# Patient Record
Sex: FEMALE | Race: ASIAN | NOT HISPANIC OR LATINO
[De-identification: names, ages, dates, MRNs, and addresses within clinical notes are randomized per-mention and may not be internally consistent; named-entity substitution may affect disease eponyms.]

---

## 2018-09-17 ENCOUNTER — RX RENEWAL (OUTPATIENT)
Age: 38
End: 2018-09-17

## 2018-09-17 PROBLEM — Z00.00 ENCOUNTER FOR PREVENTIVE HEALTH EXAMINATION: Status: ACTIVE | Noted: 2018-09-17

## 2018-11-07 ENCOUNTER — MEDICATION RENEWAL (OUTPATIENT)
Age: 38
End: 2018-11-07

## 2018-11-11 ENCOUNTER — OTHER (OUTPATIENT)
Age: 38
End: 2018-11-11

## 2018-11-20 ENCOUNTER — RECORD ABSTRACTING (OUTPATIENT)
Age: 38
End: 2018-11-20

## 2018-11-20 DIAGNOSIS — Z83.3 FAMILY HISTORY OF DIABETES MELLITUS: ICD-10-CM

## 2018-11-20 DIAGNOSIS — Z86.39 PERSONAL HISTORY OF OTHER ENDOCRINE, NUTRITIONAL AND METABOLIC DISEASE: ICD-10-CM

## 2018-11-20 DIAGNOSIS — Z83.49 FAMILY HISTORY OF OTHER ENDOCRINE, NUTRITIONAL AND METABOLIC DISEASES: ICD-10-CM

## 2018-11-21 ENCOUNTER — APPOINTMENT (OUTPATIENT)
Dept: ENDOCRINOLOGY | Facility: CLINIC | Age: 38
End: 2018-11-21
Payer: COMMERCIAL

## 2018-11-21 VITALS
WEIGHT: 179 LBS | SYSTOLIC BLOOD PRESSURE: 126 MMHG | DIASTOLIC BLOOD PRESSURE: 80 MMHG | BODY MASS INDEX: 28.77 KG/M2 | HEART RATE: 88 BPM | HEIGHT: 66 IN

## 2018-11-21 LAB — GLUCOSE BLDC GLUCOMTR-MCNC: 118

## 2018-11-21 PROCEDURE — 82962 GLUCOSE BLOOD TEST: CPT

## 2018-11-21 PROCEDURE — 99214 OFFICE O/P EST MOD 30 MIN: CPT | Mod: 25

## 2018-11-21 RX ORDER — SITAGLIPTIN 100 MG/1
100 TABLET, FILM COATED ORAL
Qty: 90 | Refills: 1 | Status: DISCONTINUED | COMMUNITY
Start: 2018-09-17 | End: 2018-11-21

## 2018-11-21 RX ORDER — PEN NEEDLE, DIABETIC 32 GX 1/4"
32G X 6 MM NEEDLE, DISPOSABLE MISCELLANEOUS
Qty: 100 | Refills: 3 | Status: ACTIVE | COMMUNITY
Start: 2018-11-21 | End: 1900-01-01

## 2019-01-24 ENCOUNTER — OTHER (OUTPATIENT)
Age: 39
End: 2019-01-24

## 2019-01-25 ENCOUNTER — CLINICAL ADVICE (OUTPATIENT)
Age: 39
End: 2019-01-25

## 2019-02-01 ENCOUNTER — CLINICAL ADVICE (OUTPATIENT)
Age: 39
End: 2019-02-01

## 2019-03-16 ENCOUNTER — RX RENEWAL (OUTPATIENT)
Age: 39
End: 2019-03-16

## 2019-03-20 ENCOUNTER — RECORD ABSTRACTING (OUTPATIENT)
Age: 39
End: 2019-03-20

## 2019-03-21 ENCOUNTER — APPOINTMENT (OUTPATIENT)
Dept: ENDOCRINOLOGY | Facility: CLINIC | Age: 39
End: 2019-03-21
Payer: COMMERCIAL

## 2019-03-21 VITALS
HEART RATE: 95 BPM | SYSTOLIC BLOOD PRESSURE: 110 MMHG | DIASTOLIC BLOOD PRESSURE: 60 MMHG | WEIGHT: 171 LBS | HEIGHT: 66 IN | BODY MASS INDEX: 27.48 KG/M2

## 2019-03-21 LAB — GLUCOSE BLDC GLUCOMTR-MCNC: 110

## 2019-03-21 PROCEDURE — 82962 GLUCOSE BLOOD TEST: CPT

## 2019-03-21 PROCEDURE — 99214 OFFICE O/P EST MOD 30 MIN: CPT | Mod: 25

## 2019-03-21 RX ORDER — LEVOTHYROXINE SODIUM 50 UG/1
50 TABLET ORAL
Refills: 0 | Status: DISCONTINUED | COMMUNITY
End: 2019-03-21

## 2019-03-21 RX ORDER — SITAGLIPTIN 100 MG/1
100 TABLET, FILM COATED ORAL
Refills: 0 | Status: DISCONTINUED | COMMUNITY
End: 2019-03-21

## 2019-03-21 RX ORDER — INSULIN DETEMIR 100 [IU]/ML
100 INJECTION, SOLUTION SUBCUTANEOUS
Refills: 0 | Status: DISCONTINUED | COMMUNITY
End: 2019-03-21

## 2019-03-21 NOTE — HISTORY OF PRESENT ILLNESS
[FreeTextEntry1] : Viviane is a 38y old Female who presents with a primary complaint of diabetes, thyroid status\par Quality:  Type 2.   \par Severity:  Moderate\par Duration:  3 years\par Onset:  found DM on labs  \par Modifying Factors:  Better with medication.  \par Notes:  Current regimen:\par 	metformin	1000 bid\par 	trulicity .75\par 	levemir 	20 - tapered off\par levemir started soon after diagnosis of diabetes. On presentation A1C was over 12% but without symptoms or weight loss.\par Placed on actos, but discontinued due to edema.\par monitors glucose; occasional post meal severe elevations\par \par \par \par Blood Glucose Testing Information \par \par Patient is using the  meter and is testing 3 times per day.\par \par \par Past Medical History\par Notes:  hypothyroid, on T4 .075. Recent diagnosis\par Other: no plans for childbearing.  \par

## 2019-03-21 NOTE — ASSESSMENT
[FreeTextEntry1] : 1. DM type 2, well controlled. Stop insulin\par 2. Hypothyroid, euthyroid on replacement\par 3. Hyperlipidemia controlled

## 2019-03-21 NOTE — PHYSICAL EXAM
[Thyroid Not Enlarged] : the thyroid was not enlarged [No Thyroid Nodules] : there were no palpable thyroid nodules

## 2019-03-29 ENCOUNTER — OUTPATIENT (OUTPATIENT)
Dept: OUTPATIENT SERVICES | Facility: HOSPITAL | Age: 39
LOS: 1 days | End: 2019-03-29

## 2019-06-12 ENCOUNTER — RX RENEWAL (OUTPATIENT)
Age: 39
End: 2019-06-12

## 2019-08-02 ENCOUNTER — APPOINTMENT (OUTPATIENT)
Dept: ENDOCRINOLOGY | Facility: CLINIC | Age: 39
End: 2019-08-02
Payer: COMMERCIAL

## 2019-08-02 VITALS
OXYGEN SATURATION: 98 % | WEIGHT: 162 LBS | DIASTOLIC BLOOD PRESSURE: 78 MMHG | HEIGHT: 66 IN | SYSTOLIC BLOOD PRESSURE: 120 MMHG | BODY MASS INDEX: 26.03 KG/M2 | HEART RATE: 80 BPM

## 2019-08-02 LAB — GLUCOSE BLDC GLUCOMTR-MCNC: 94

## 2019-08-02 PROCEDURE — 82962 GLUCOSE BLOOD TEST: CPT

## 2019-08-02 PROCEDURE — 99214 OFFICE O/P EST MOD 30 MIN: CPT | Mod: 25

## 2019-08-02 NOTE — HISTORY OF PRESENT ILLNESS
[FreeTextEntry1] : Viviane is a 39y old Female who presents with a primary complaint of diabetes, thyroid status\par Quality:  Type 2.   \par Severity:  Moderate\par Duration:  3 years\par Onset:  found DM on labs  \par Modifying Factors:  Better with medication.  \par Notes:  Current regimen:\par 	metformin	1000 bid\par 	trulicity .75\par 	levemir 	20 - tapered off\par levemir started soon after diagnosis of diabetes. On presentation A1C was over 12% but without symptoms or weight loss.\par Placed on actos, but discontinued due to edema.\par monitors glucose; occasional post meal severe elevations\par \par \par \par Blood Glucose Testing Information \par \par Patient is using the  meter and is testing 3 times per day.\par \par \par Past Medical History\par Notes:  hypothyroid, on T4 .075. Recent diagnosis\par Other: no plans for childbearing.  \par

## 2019-08-02 NOTE — ASSESSMENT
[FreeTextEntry1] : 1. DM type 2, well controlled. Will reduce metformin to 500 bid, as pt. may have some Gi intolerance from this, and is doing very well now.\par 2. Hypothyroid, slight TSH elevation may be due to recent lapse in T4 use. Resume .075\par 3. Hyperlipidemia controlled

## 2019-08-12 ENCOUNTER — MEDICATION RENEWAL (OUTPATIENT)
Age: 39
End: 2019-08-12

## 2019-11-04 ENCOUNTER — MEDICATION RENEWAL (OUTPATIENT)
Age: 39
End: 2019-11-04

## 2019-11-07 ENCOUNTER — RX RENEWAL (OUTPATIENT)
Age: 39
End: 2019-11-07

## 2020-01-07 ENCOUNTER — LABORATORY RESULT (OUTPATIENT)
Age: 40
End: 2020-01-07

## 2020-01-07 ENCOUNTER — APPOINTMENT (OUTPATIENT)
Dept: ENDOCRINOLOGY | Facility: CLINIC | Age: 40
End: 2020-01-07
Payer: COMMERCIAL

## 2020-01-07 PROCEDURE — 36415 COLL VENOUS BLD VENIPUNCTURE: CPT

## 2020-01-08 ENCOUNTER — APPOINTMENT (OUTPATIENT)
Dept: ENDOCRINOLOGY | Facility: CLINIC | Age: 40
End: 2020-01-08
Payer: COMMERCIAL

## 2020-01-08 VITALS
HEIGHT: 66 IN | DIASTOLIC BLOOD PRESSURE: 70 MMHG | HEART RATE: 80 BPM | OXYGEN SATURATION: 99 % | SYSTOLIC BLOOD PRESSURE: 110 MMHG | BODY MASS INDEX: 26.68 KG/M2 | WEIGHT: 166 LBS

## 2020-01-08 LAB
ALBUMIN SERPL ELPH-MCNC: 4.2 G/DL
ALP BLD-CCNC: 56 U/L
ALT SERPL-CCNC: 12 U/L
ANION GAP SERPL CALC-SCNC: 13 MMOL/L
AST SERPL-CCNC: 14 U/L
BASOPHILS # BLD AUTO: 0.03 K/UL
BASOPHILS NFR BLD AUTO: 0.4 %
BILIRUB SERPL-MCNC: 0.5 MG/DL
BUN SERPL-MCNC: 15 MG/DL
CALCIUM SERPL-MCNC: 9.7 MG/DL
CHLORIDE SERPL-SCNC: 103 MMOL/L
CHOLEST SERPL-MCNC: 110 MG/DL
CHOLEST/HDLC SERPL: 2.3 RATIO
CO2 SERPL-SCNC: 23 MMOL/L
CREAT SERPL-MCNC: 0.71 MG/DL
EOSINOPHIL # BLD AUTO: 0.04 K/UL
EOSINOPHIL NFR BLD AUTO: 0.6 %
ESTIMATED AVERAGE GLUCOSE: 134 MG/DL
GLUCOSE BLDC GLUCOMTR-MCNC: 96
GLUCOSE SERPL-MCNC: 134 MG/DL
HBA1C MFR BLD HPLC: 6.3 %
HCT VFR BLD CALC: 40.6 %
HDLC SERPL-MCNC: 47 MG/DL
HGB BLD-MCNC: 12.4 G/DL
IMM GRANULOCYTES NFR BLD AUTO: 0.1 %
LDLC SERPL CALC-MCNC: 47 MG/DL
LYMPHOCYTES # BLD AUTO: 1.72 K/UL
LYMPHOCYTES NFR BLD AUTO: 25.6 %
MAN DIFF?: NORMAL
MCHC RBC-ENTMCNC: 24.7 PG
MCHC RBC-ENTMCNC: 30.5 GM/DL
MCV RBC AUTO: 80.7 FL
MONOCYTES # BLD AUTO: 0.3 K/UL
MONOCYTES NFR BLD AUTO: 4.5 %
NEUTROPHILS # BLD AUTO: 4.62 K/UL
NEUTROPHILS NFR BLD AUTO: 68.8 %
PLATELET # BLD AUTO: 282 K/UL
POTASSIUM SERPL-SCNC: 4.6 MMOL/L
PROT SERPL-MCNC: 6.4 G/DL
RBC # BLD: 5.03 M/UL
RBC # FLD: 16.6 %
SODIUM SERPL-SCNC: 139 MMOL/L
T3RU NFR SERPL: 1 TBI
T4 SERPL-MCNC: 6.1 UG/DL
TRIGL SERPL-MCNC: 80 MG/DL
TSH SERPL-ACNC: 4.6 UIU/ML
WBC # FLD AUTO: 6.72 K/UL

## 2020-01-08 PROCEDURE — 82962 GLUCOSE BLOOD TEST: CPT

## 2020-01-08 PROCEDURE — 99214 OFFICE O/P EST MOD 30 MIN: CPT | Mod: 25

## 2020-01-08 NOTE — HISTORY OF PRESENT ILLNESS
[FreeTextEntry1] : Viviane is a 39y old Female who presents with a primary complaint of diabetes, thyroid status\par Quality:  Type 2.   \par Severity:  Moderate\par Duration:  4 years\par Onset:  found DM on labs  \par Modifying Factors:  Better with medication.  \par Notes:  Current regimen:\par 	metformin	 500 bid\par 	trulicity .75\par 	levemir 	20 - tapered off\par levemir started soon after diagnosis of diabetes. On presentation A1C was over 12% but without symptoms or weight loss.\par Placed on actos, but discontinued due to edema.\par monitors glucose; occasional post meal severe elevations\par \par \par \par Blood Glucose Testing Information \par \par Patient is using the  meter and is testing 3 times per day.\par \par \par Past Medical History\par Notes:  hypothyroid, on T4 .075. Recent diagnosis\par Other: no plans for childbearing.  \par

## 2020-01-08 NOTE — ASSESSMENT
[FreeTextEntry1] : 1. DM type 2, well controlled. \par 2. Hypothyroid, slight TSH elevation persists. Increase T4 to .088\par 3. Hyperlipidemia controlled

## 2020-04-14 ENCOUNTER — TRANSCRIPTION ENCOUNTER (OUTPATIENT)
Age: 40
End: 2020-04-14

## 2020-07-02 ENCOUNTER — LABORATORY RESULT (OUTPATIENT)
Age: 40
End: 2020-07-02

## 2020-07-02 ENCOUNTER — APPOINTMENT (OUTPATIENT)
Dept: ENDOCRINOLOGY | Facility: CLINIC | Age: 40
End: 2020-07-02
Payer: COMMERCIAL

## 2020-07-02 PROCEDURE — 36415 COLL VENOUS BLD VENIPUNCTURE: CPT

## 2020-07-03 LAB
ALBUMIN SERPL ELPH-MCNC: 4.7 G/DL
ALP BLD-CCNC: 54 U/L
ALT SERPL-CCNC: 14 U/L
ANION GAP SERPL CALC-SCNC: 15 MMOL/L
AST SERPL-CCNC: 16 U/L
BASOPHILS # BLD AUTO: 0.06 K/UL
BASOPHILS NFR BLD AUTO: 0.8 %
BILIRUB SERPL-MCNC: 0.4 MG/DL
BUN SERPL-MCNC: 21 MG/DL
CALCIUM SERPL-MCNC: 9.7 MG/DL
CHLORIDE SERPL-SCNC: 101 MMOL/L
CHOLEST SERPL-MCNC: 167 MG/DL
CHOLEST/HDLC SERPL: 3 RATIO
CO2 SERPL-SCNC: 22 MMOL/L
CREAT SERPL-MCNC: 0.75 MG/DL
CREAT SPEC-SCNC: 153 MG/DL
EOSINOPHIL # BLD AUTO: 0.06 K/UL
EOSINOPHIL NFR BLD AUTO: 0.8 %
ESTIMATED AVERAGE GLUCOSE: 157 MG/DL
GLUCOSE SERPL-MCNC: 138 MG/DL
HBA1C MFR BLD HPLC: 7.1 %
HCT VFR BLD CALC: 43.6 %
HDLC SERPL-MCNC: 55 MG/DL
HGB BLD-MCNC: 13.3 G/DL
IMM GRANULOCYTES NFR BLD AUTO: 0.1 %
LDLC SERPL CALC-MCNC: 95 MG/DL
LYMPHOCYTES # BLD AUTO: 1.91 K/UL
LYMPHOCYTES NFR BLD AUTO: 26.2 %
MAN DIFF?: NORMAL
MCHC RBC-ENTMCNC: 25.1 PG
MCHC RBC-ENTMCNC: 30.5 GM/DL
MCV RBC AUTO: 82.4 FL
MICROALBUMIN 24H UR DL<=1MG/L-MCNC: <1.2 MG/DL
MICROALBUMIN/CREAT 24H UR-RTO: NORMAL MG/G
MONOCYTES # BLD AUTO: 0.38 K/UL
MONOCYTES NFR BLD AUTO: 5.2 %
NEUTROPHILS # BLD AUTO: 4.86 K/UL
NEUTROPHILS NFR BLD AUTO: 66.9 %
PLATELET # BLD AUTO: 251 K/UL
POTASSIUM SERPL-SCNC: 4.6 MMOL/L
PROT SERPL-MCNC: 7.1 G/DL
RBC # BLD: 5.29 M/UL
RBC # FLD: 16.4 %
SODIUM SERPL-SCNC: 138 MMOL/L
T3RU NFR SERPL: 1 TBI
T4 SERPL-MCNC: 7.4 UG/DL
TRIGL SERPL-MCNC: 83 MG/DL
TSH SERPL-ACNC: 1.91 UIU/ML
WBC # FLD AUTO: 7.28 K/UL

## 2020-07-06 ENCOUNTER — APPOINTMENT (OUTPATIENT)
Dept: ENDOCRINOLOGY | Facility: CLINIC | Age: 40
End: 2020-07-06
Payer: COMMERCIAL

## 2020-07-06 PROCEDURE — 99214 OFFICE O/P EST MOD 30 MIN: CPT | Mod: 95

## 2020-07-06 RX ORDER — LANCETS 33 GAUGE
EACH MISCELLANEOUS
Qty: 4 | Refills: 3 | Status: ACTIVE | COMMUNITY
Start: 2020-07-06 | End: 1900-01-01

## 2020-07-06 RX ORDER — BLOOD SUGAR DIAGNOSTIC
STRIP MISCELLANEOUS
Qty: 400 | Refills: 1 | Status: ACTIVE | COMMUNITY
Start: 2018-09-17 | End: 1900-01-01

## 2020-07-06 NOTE — ASSESSMENT
[FreeTextEntry1] : 1. DM type 2, mild loss of control. Can do better with lifestyle changes, no need for additional drug rx\par 2. Hypothyroid, euthyroid on replacement\par 3. Hyperlipidemia controlled

## 2020-07-06 NOTE — HISTORY OF PRESENT ILLNESS
[Other Location: e.g. School (Enter Location, City,State)___] : at [unfilled], at the time of the visit. [Medical Office: ([facility])___] : at the medical office located in  [Verbal consent obtained from patient] : the patient, [unfilled] [FreeTextEntry1] : Viviane is a 39y old Female who presents with a primary complaint of diabetes, thyroid status\par Quality:  Type 2.   \par Severity:  Moderate\par Duration:  4 years\par Onset:  found DM on labs  \par Modifying Factors:  Better with medication.  \par Notes:  Current regimen:\par 	metformin	 500 bid\par 	trulicity .75\par 	levemir 	20 - tapered off\par levemir started soon after diagnosis of diabetes. On presentation A1C was over 12% but without symptoms or weight loss.\par Placed on actos, but discontinued due to edema.\par monitors glucose; occasional post meal severe elevations\par \par \par \par Blood Glucose Testing Information \par \par Patient is using the  meter and is testing 3 times per day.\par \par \par Past Medical History\par Notes:  hypothyroid, on T4 .088. Recent diagnosis\par Other: no plans for childbearing.  \par s/p COVID

## 2020-07-09 ENCOUNTER — RX RENEWAL (OUTPATIENT)
Age: 40
End: 2020-07-09

## 2020-10-19 ENCOUNTER — RX RENEWAL (OUTPATIENT)
Age: 40
End: 2020-10-19

## 2020-12-22 ENCOUNTER — RX RENEWAL (OUTPATIENT)
Age: 40
End: 2020-12-22

## 2021-01-22 ENCOUNTER — APPOINTMENT (OUTPATIENT)
Dept: ENDOCRINOLOGY | Facility: CLINIC | Age: 41
End: 2021-01-22
Payer: COMMERCIAL

## 2021-01-22 VITALS
DIASTOLIC BLOOD PRESSURE: 82 MMHG | OXYGEN SATURATION: 98 % | HEIGHT: 66 IN | SYSTOLIC BLOOD PRESSURE: 120 MMHG | WEIGHT: 174 LBS | HEART RATE: 72 BPM | BODY MASS INDEX: 27.97 KG/M2

## 2021-01-22 DIAGNOSIS — E78.9 DISORDER OF LIPOPROTEIN METABOLISM, UNSPECIFIED: ICD-10-CM

## 2021-01-22 LAB
GLUCOSE BLDC GLUCOMTR-MCNC: 172
HBA1C MFR BLD HPLC: 7.3
LDLC SERPL DIRECT ASSAY-MCNC: 80

## 2021-01-22 PROCEDURE — 99214 OFFICE O/P EST MOD 30 MIN: CPT | Mod: 25

## 2021-01-22 PROCEDURE — 99072 ADDL SUPL MATRL&STAF TM PHE: CPT

## 2021-01-22 PROCEDURE — 82962 GLUCOSE BLOOD TEST: CPT

## 2021-01-22 NOTE — ASSESSMENT
[FreeTextEntry1] : 1. DM type 2, loss of control. Has had prior success with keot diet, bt relapsed when off diet. Advise f/u with CDE, and increase in trulicity to 1.5 mg\par 2. Hypothyroid, euthyroid on replacement\par 3. Hyperlipidemia controlled

## 2021-01-22 NOTE — HISTORY OF PRESENT ILLNESS
[FreeTextEntry1] : Viviane is a 40y old Female who presents with a primary complaint of diabetes, thyroid status\par Quality:  Type 2.   \par Severity:  Moderate\par Duration:  5 years\par Onset:  found DM on labs  \par Modifying Factors:  Better with medication.  \par Notes:  Current regimen:\par 	metformin	 500 bid\par 	trulicity .75\par 	levemir 	20 - tapered off\par levemir started soon after diagnosis of diabetes. On presentation A1C was over 12% but without symptoms or weight loss.\par Placed on actos, but discontinued due to edema.\par monitors glucose; occasional post meal severe elevations\par \par \par \par Blood Glucose Testing Information \par \par Patient is using the  meter and is testing 3 times per day.\par \par \par Past Medical History\par Notes:  hypothyroid, on T4 .088. \par Other: no plans for childbearing.  \par

## 2021-05-08 ENCOUNTER — RX RENEWAL (OUTPATIENT)
Age: 41
End: 2021-05-08

## 2021-06-04 ENCOUNTER — RX RENEWAL (OUTPATIENT)
Age: 41
End: 2021-06-04

## 2021-07-01 LAB
HBA1C MFR BLD HPLC: 11.1
LDLC SERPL DIRECT ASSAY-MCNC: 52

## 2021-07-02 ENCOUNTER — APPOINTMENT (OUTPATIENT)
Dept: ENDOCRINOLOGY | Facility: CLINIC | Age: 41
End: 2021-07-02
Payer: COMMERCIAL

## 2021-07-02 VITALS
DIASTOLIC BLOOD PRESSURE: 74 MMHG | WEIGHT: 175 LBS | SYSTOLIC BLOOD PRESSURE: 120 MMHG | BODY MASS INDEX: 28.12 KG/M2 | HEART RATE: 88 BPM | HEIGHT: 66 IN | OXYGEN SATURATION: 99 %

## 2021-07-02 DIAGNOSIS — E11.9 TYPE 2 DIABETES MELLITUS W/OUT COMPLICATIONS: ICD-10-CM

## 2021-07-02 DIAGNOSIS — E78.00 PURE HYPERCHOLESTEROLEMIA, UNSPECIFIED: ICD-10-CM

## 2021-07-02 LAB — GLUCOSE BLDC GLUCOMTR-MCNC: 118

## 2021-07-02 PROCEDURE — 99214 OFFICE O/P EST MOD 30 MIN: CPT | Mod: 25

## 2021-07-02 PROCEDURE — 99072 ADDL SUPL MATRL&STAF TM PHE: CPT

## 2021-07-02 PROCEDURE — 82962 GLUCOSE BLOOD TEST: CPT

## 2021-07-02 RX ORDER — DULAGLUTIDE 1.5 MG/.5ML
1.5 INJECTION, SOLUTION SUBCUTANEOUS
Qty: 12 | Refills: 3 | Status: ACTIVE | COMMUNITY
Start: 2019-06-12 | End: 1900-01-01

## 2021-07-02 NOTE — ASSESSMENT
[FreeTextEntry1] : 1. DM type 2, recent improvement in control with diet changes and increase in metformin. Increase trulicity to 1.5\par 2. Hypothyroid, euthyroid on replacement\par 3. Hyperlipidemia controlled

## 2021-07-02 NOTE — HISTORY OF PRESENT ILLNESS
[FreeTextEntry1] : Viviane is a 41y old Female who presents with a primary complaint of diabetes, thyroid status\par Quality:  Type 2.   \par Severity:  Moderate\par Duration:  2 years\par Onset:  found DM on labs  \par Modifying Factors:  Better with medication.  \par Notes:  Current regimen:\par 	metformin	 500 two bid\par 	trulicity .75\par 	levemir 	20 - tapered off\par levemir started soon after diagnosis of diabetes. On presentation A1C was over 12% but without symptoms or weight loss.\par Placed on actos, but discontinued due to edema.\par Loss of control 4/2021 with dietary poor choices; no symptoms. NOw improved\par \par \par \par Blood Glucose Testing Information \par \par Patient is using the  meter and is testing 3 times per day.\par \par \par Past Medical History\par Notes:  hypothyroid, on T4 .088. \par Other: no plans for childbearing.  \par

## 2021-09-15 ENCOUNTER — RX RENEWAL (OUTPATIENT)
Age: 41
End: 2021-09-15

## 2021-09-15 RX ORDER — METFORMIN HYDROCHLORIDE 500 MG/1
500 TABLET, COATED ORAL
Qty: 180 | Refills: 3 | Status: ACTIVE | COMMUNITY
Start: 2018-09-17 | End: 1900-01-01

## 2021-10-24 ENCOUNTER — RX RENEWAL (OUTPATIENT)
Age: 41
End: 2021-10-24

## 2021-10-29 ENCOUNTER — APPOINTMENT (OUTPATIENT)
Dept: ENDOCRINOLOGY | Facility: CLINIC | Age: 41
End: 2021-10-29
Payer: COMMERCIAL

## 2021-10-29 PROCEDURE — 36415 COLL VENOUS BLD VENIPUNCTURE: CPT

## 2021-10-31 LAB
ALBUMIN SERPL ELPH-MCNC: 4.4 G/DL
ALP BLD-CCNC: 86 U/L
ALT SERPL-CCNC: 19 U/L
ANION GAP SERPL CALC-SCNC: 12 MMOL/L
AST SERPL-CCNC: 16 U/L
BASOPHILS # BLD AUTO: 0.05 K/UL
BASOPHILS NFR BLD AUTO: 0.7 %
BILIRUB SERPL-MCNC: 0.5 MG/DL
BUN SERPL-MCNC: 9 MG/DL
CALCIUM SERPL-MCNC: 9.2 MG/DL
CHLORIDE SERPL-SCNC: 104 MMOL/L
CHOLEST SERPL-MCNC: 110 MG/DL
CO2 SERPL-SCNC: 23 MMOL/L
CREAT SERPL-MCNC: 0.66 MG/DL
CREAT SPEC-SCNC: 64 MG/DL
EOSINOPHIL # BLD AUTO: 0.05 K/UL
EOSINOPHIL NFR BLD AUTO: 0.7 %
ESTIMATED AVERAGE GLUCOSE: 203 MG/DL
GLUCOSE SERPL-MCNC: 180 MG/DL
HBA1C MFR BLD HPLC: 8.7 %
HCT VFR BLD CALC: 41.8 %
HDLC SERPL-MCNC: 42 MG/DL
HGB BLD-MCNC: 12.8 G/DL
IMM GRANULOCYTES NFR BLD AUTO: 0.3 %
LDLC SERPL CALC-MCNC: 45 MG/DL
LYMPHOCYTES # BLD AUTO: 1.96 K/UL
LYMPHOCYTES NFR BLD AUTO: 26.5 %
MAN DIFF?: NORMAL
MCHC RBC-ENTMCNC: 24.8 PG
MCHC RBC-ENTMCNC: 30.6 GM/DL
MCV RBC AUTO: 81 FL
MICROALBUMIN 24H UR DL<=1MG/L-MCNC: <1.2 MG/DL
MICROALBUMIN/CREAT 24H UR-RTO: NORMAL MG/G
MONOCYTES # BLD AUTO: 0.36 K/UL
MONOCYTES NFR BLD AUTO: 4.9 %
NEUTROPHILS # BLD AUTO: 4.96 K/UL
NEUTROPHILS NFR BLD AUTO: 66.9 %
NONHDLC SERPL-MCNC: 68 MG/DL
PLATELET # BLD AUTO: 259 K/UL
POTASSIUM SERPL-SCNC: 4.9 MMOL/L
PROT SERPL-MCNC: 6.8 G/DL
RBC # BLD: 5.16 M/UL
RBC # FLD: 15.1 %
SODIUM SERPL-SCNC: 139 MMOL/L
TRIGL SERPL-MCNC: 115 MG/DL
TSH SERPL-ACNC: 2.17 UIU/ML
WBC # FLD AUTO: 7.4 K/UL

## 2021-11-12 ENCOUNTER — APPOINTMENT (OUTPATIENT)
Dept: ENDOCRINOLOGY | Facility: CLINIC | Age: 41
End: 2021-11-12
Payer: COMMERCIAL

## 2021-11-12 VITALS
SYSTOLIC BLOOD PRESSURE: 124 MMHG | HEIGHT: 66 IN | BODY MASS INDEX: 28.61 KG/M2 | WEIGHT: 178 LBS | OXYGEN SATURATION: 98 % | HEART RATE: 91 BPM | DIASTOLIC BLOOD PRESSURE: 72 MMHG

## 2021-11-12 DIAGNOSIS — I10 ESSENTIAL (PRIMARY) HYPERTENSION: ICD-10-CM

## 2021-11-12 DIAGNOSIS — E03.9 HYPOTHYROIDISM, UNSPECIFIED: ICD-10-CM

## 2021-11-12 DIAGNOSIS — E11.65 TYPE 2 DIABETES MELLITUS WITH HYPERGLYCEMIA: ICD-10-CM

## 2021-11-12 LAB — GLUCOSE BLDC GLUCOMTR-MCNC: 154

## 2021-11-12 PROCEDURE — 99214 OFFICE O/P EST MOD 30 MIN: CPT | Mod: 25

## 2021-11-12 PROCEDURE — 82962 GLUCOSE BLOOD TEST: CPT

## 2021-11-12 PROCEDURE — 36415 COLL VENOUS BLD VENIPUNCTURE: CPT

## 2021-11-12 NOTE — ASSESSMENT
[FreeTextEntry1] : 1. DM, uncontrolled. APpears to have type 2 based on strong family history and mild acanthosis, but atypical features, including severe hyperglycemia at onset, and failure to control well on metformin and a glp-1. Also family history suggests poor control and relatively young age of onset across multiple family members. Will check BALDEMAR and c-peptide, and then consider revision in meds. CUrrently control recently improved due to plant-based diet. Hold trulicity due to gi intolerance\par 2. Hypothyroid, euthyroid on replacement\par 3. Hyperlipidemia controlled

## 2021-11-12 NOTE — HISTORY OF PRESENT ILLNESS
[FreeTextEntry1] : Viviane is a 41y old Female who presents with a primary complaint of diabetes, thyroid status\par Quality:  Type 2.   \par Severity:  Moderate\par Duration:  4 years\par Onset:  found DM on labs  \par Modifying Factors:  Better with medication.  \par Notes:  Current regimen:\par 	metformin	 500 two bid\par 	trulicity 1.5\par 	levemir 	20 - tapered off\par levemir started soon after diagnosis of diabetes. On presentation A1C was over 12% but without symptoms or weight loss.\par Placed on actos, but discontinued due to edema.\par Loss of control 4/2021 with dietary poor choices; no symptoms. NOw improved\par \par \par \par Blood Glucose Testing Information \par \par Patient is using the  meter and is testing 3 times per day.\par \par \par Past Medical History\par Notes:  hypothyroid, on T4 .088. \par Other: no plans for childbearing.  \par \par strong family history of uncontrolled diabetes, with relatively young onset, usually requiring insulin.

## 2021-11-12 NOTE — PHYSICAL EXAM
[Thyroid Not Enlarged] : the thyroid was not enlarged [Clear to Auscultation] : lungs were clear to auscultation bilaterally [Normal Rate] : heart rate was normal [Acanthosis Nigricans] : acanthosis nigricans present

## 2021-11-15 LAB
C PEPTIDE SERPL-MCNC: 2.6 NG/ML
GLUCOSE SERPL-MCNC: 152 MG/DL

## 2021-11-17 LAB — GAD65 AB SER-MCNC: 0 NMOL/L

## 2022-03-25 ENCOUNTER — APPOINTMENT (OUTPATIENT)
Dept: ENDOCRINOLOGY | Facility: CLINIC | Age: 42
End: 2022-03-25

## 2022-05-22 ENCOUNTER — RX RENEWAL (OUTPATIENT)
Age: 42
End: 2022-05-22

## 2022-05-26 ENCOUNTER — RX RENEWAL (OUTPATIENT)
Age: 42
End: 2022-05-26

## 2022-09-01 ENCOUNTER — RX RENEWAL (OUTPATIENT)
Age: 42
End: 2022-09-01

## 2022-11-07 ENCOUNTER — RX RENEWAL (OUTPATIENT)
Age: 42
End: 2022-11-07

## 2022-11-07 RX ORDER — ATORVASTATIN CALCIUM 20 MG/1
20 TABLET, FILM COATED ORAL
Qty: 90 | Refills: 3 | Status: ACTIVE | COMMUNITY
Start: 2018-09-17 | End: 1900-01-01

## 2022-11-21 ENCOUNTER — RX RENEWAL (OUTPATIENT)
Age: 42
End: 2022-11-21

## 2022-11-21 RX ORDER — LEVOTHYROXINE SODIUM 0.09 MG/1
88 TABLET ORAL DAILY
Qty: 90 | Refills: 0 | Status: ACTIVE | COMMUNITY
Start: 2018-11-21 | End: 1900-01-01

## 2023-04-18 ENCOUNTER — RX RENEWAL (OUTPATIENT)
Age: 43
End: 2023-04-18

## 2023-04-18 RX ORDER — LOSARTAN POTASSIUM 25 MG/1
25 TABLET, FILM COATED ORAL
Qty: 90 | Refills: 0 | Status: ACTIVE | COMMUNITY
Start: 2018-09-17 | End: 1900-01-01

## 2023-07-31 ENCOUNTER — RX RENEWAL (OUTPATIENT)
Age: 43
End: 2023-07-31

## 2023-09-29 ENCOUNTER — APPOINTMENT (OUTPATIENT)
Dept: ORTHOPEDIC SURGERY | Facility: CLINIC | Age: 43
End: 2023-09-29
Payer: OTHER MISCELLANEOUS

## 2023-09-29 ENCOUNTER — APPOINTMENT (OUTPATIENT)
Dept: MRI IMAGING | Facility: CLINIC | Age: 43
End: 2023-09-29
Payer: OTHER MISCELLANEOUS

## 2023-09-29 PROCEDURE — 73221 MRI JOINT UPR EXTREM W/O DYE: CPT | Mod: RT

## 2023-09-29 PROCEDURE — 73030 X-RAY EXAM OF SHOULDER: CPT | Mod: RT

## 2023-09-29 PROCEDURE — 99204 OFFICE O/P NEW MOD 45 MIN: CPT

## 2023-10-06 ENCOUNTER — APPOINTMENT (OUTPATIENT)
Dept: ORTHOPEDIC SURGERY | Facility: CLINIC | Age: 43
End: 2023-10-06
Payer: OTHER MISCELLANEOUS

## 2023-10-06 PROCEDURE — 99214 OFFICE O/P EST MOD 30 MIN: CPT | Mod: 25

## 2023-10-06 PROCEDURE — 20611 DRAIN/INJ JOINT/BURSA W/US: CPT | Mod: RT

## 2023-11-17 ENCOUNTER — APPOINTMENT (OUTPATIENT)
Dept: ORTHOPEDIC SURGERY | Facility: CLINIC | Age: 43
End: 2023-11-17
Payer: OTHER MISCELLANEOUS

## 2023-11-17 PROCEDURE — 99214 OFFICE O/P EST MOD 30 MIN: CPT

## 2024-01-26 ENCOUNTER — APPOINTMENT (OUTPATIENT)
Dept: ORTHOPEDIC SURGERY | Facility: CLINIC | Age: 44
End: 2024-01-26
Payer: OTHER MISCELLANEOUS

## 2024-01-26 PROCEDURE — 99214 OFFICE O/P EST MOD 30 MIN: CPT

## 2024-01-26 NOTE — PHYSICAL EXAM
[de-identified] : Constitutional: The general appearance of the patient is well developed, well nourished, no deformities and well groomed. Normal   Gait: Gait and function is as follows: normal gait.   Skin: Head and neck visualized skin is normal. Left upper extremity visualized skin is normal. Right upper extremity visualized skin is normal. Thoracic Skin of the thoracic spine shows visualized skin is normal.   Cardiovascular: palpable radial pulse bilaterally, good capillary refill in digits of the bilateral upper extremities and no temperature or color changes in the bilateral upper extremities.   Lymphatic: Normal Palpation of lymph nodes in the cervical.   Neurologic: fine motor control in the bilateral upper extremities is intact. Deep Tendon Reflexes in Upper and Lower Extremities Negative Eller's in the bilateral upper extremities. The patient is oriented to time, place and person. Sensation to light touch intact in the bilateral upper extremities. Mood and Affect is normal.   Right Shoulder: Inspection of the shoulder/upper arm is as follows: Stable shoulder. Palpation of the shoulder/upper arm is as follows: There is tenderness at the AC joint, proximal biceps tendon and supraspinatus tendon. Range of motion of the shoulder is as follows: Pain with internal rotation, external rotation, abduction and forward flexion. Strength of the shoulder is as follows: Supraspinatus 4/5. External Rotation 4/5. Internal Rotation 4/5. Infraspinatus 5/5 4/5. Deltoid 5/5 Ligament Stability and Special Tests of the shoulder is as follows: Neer test is positive. Edmond' test is positive. Speed's test is positive.   Left Shoulder: Inspection of the shoulder/upper arm is as follows: There is a full, pain-free, stable range of motion of the shoulder with normal strength and no tenderness to palpation.   Neck: Inspection / Palpation of the cervical spine is as follows: There is a mild restricted range of motion of the cervical spine. Increase tone and mild tenderness to palpation. Stable ROM of cervical spine.   Back, including spine: Inspection / Palpation of the thoracic/lumbar spine is as follows: There is a full, pain free, stable range of motion of the thoracic spine with a normal tone and not tenderness to palpation..

## 2024-01-26 NOTE — HISTORY OF PRESENT ILLNESS
[de-identified] : 43-year-old female presenting with severe right shoulder pain from original work-related injury that occurred 7/12/2023.  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  She states her pain started after assisting a severely neurologic deficit patient and assisted with transferring.  Patient started to the anterior aspect of the shoulder, now experiencing severe lateral pain.  Has significantly limited range of motion secondary to pain.  Admits to generalized stiffness.  She has been taking meloxicam as well as Tylenol prescribed by her PCP with no relief. Patient is RHD, PMHx DMII, working full duty  10/6/23: Patient here for right shoulder pain. Patient here for MRI review. Pt admits she can lift her arm more but still has pain  11/17/23: Patient here for right shoulder pain. Prior glenohumeral injection provided minimal relief. The pain returned and has become severe. Pt wishes to discuss definitive treatment.  Patient has been doing home exercise program and virtual physical therapy for more than 6 weeks without any improvement. 1/26/24:Patient returns today for follow-up of ongoing right shoulder pain.  Continues to report persistent pain and stiffness.  She has failed all conservative management wishes to discuss surgery.

## 2024-01-26 NOTE — DISCUSSION/SUMMARY
[de-identified] : 43-year-old female presenting with severe right shoulder pain from original work-related injury that occurred 7/12/2023.  MRI demonstrates moderate to severe adhesive capsulitis  Patient continues to have moderate stiffness and constant pain on exam. She has failed all conservative management at this point time and wishes to discuss surgery.  The patient is indicated for a Right shoulder arthroscopic debridement with capsulectomy, biceps tenodesis, and subacromial decompression.  * Surgery: Considering patient has failed all conservative treatment we are requesting authorization for: Right shoulder arthroscopic debridement with capsulectomy (CPT 24411), mini open biceps (CPT 31030 ), Subacromial decompression (09092).  We had a long discussion about the risks and benefits of operative and non-operative treatment. We discussed the pertinent risks of surgery which include but are not limited to infection, pain, stiffness, arthrofibrosis, failure to alleviate symptoms, and injury to nerves or vessels. In addition, we discussed in great detail the postoperative protocol to include appropriate bracing and time of immobilization. Patient was fit for the [employer]ace in the office today. We discussed limitations in postoperative driving as well as the appropriate use of pain medication prescribed after surgery. The patient acknowledged all of the above and will proceed with the recommended surgery. Prescriptions for postoperative medications were provided. All final questions were answered to the patients satisfaction. The patient will follow up at his scheduled surgical time.  As a post-operative protocol, I am prescribing an iceless cold/heat compression therapy device for at home use to be used 3-5 times per day at 40 degrees for 35 days as an alternative to pain medication. I would like my patient to begin with simultaneous cold & compression therapy at 10mmHg pressure. At the patients follow up I will determine whether they should continue with cold, or if they should transition to contrast cold/heat compression therapy. Unlike a conventional cold therapy unit that requires ice, the ThermX iceless device is set to a prescribed temperature that it will remain throughout the entire duration of use, whether that be cold compression, heat compression, or contrast compression. Cold therapy units that depend on ice melt over a very short period and do not provide compression which limits the compliance and effectiveness for pain/inflammation reduction that I am targeting for my patient. I have reached out to [facility] to supply this device as they are the exclusive provider of the ThermX and the patient will be contacted and instructed on how to utilize the device.   (1) We discussed a comprehensive treatment plans that included a prescription management plan involving the use of prescription strength medications to include Ibuprofen 600-800 mg TID, versus 500-650 mg Tylenol. We also discussed prescribing topical diclofenac (Voltaren gel) as well as once daily Meloxicam 15 mg. (2) The patient has More Than One chronic injuries/illnesses as outlined, discussed, and documented by ICD 10 codes listed, as well as the HPI and Plan section. There is a moderate risk of morbidity with further treatment, especially from use of prescription strength medications and possible side effects of these medications which include upset stomach and cardiac/renal issues with long term use were discussed. (3) I recommended that the patient follow-up with their medical physician to discuss any significant specific potential issues with long term use such as interactions with current medications or with exacerbation of underlying medical morbidities.

## 2024-01-26 NOTE — WORK
[Sprain/Strain] : sprain/strain [Torn Ligament/Tendon/Muscle] : torn ligament, tendon or muscle [Was the competent medical cause of the injury] : was the competent medical cause of the injury [Are consistent with the injury] : are consistent with the injury [Severe Partial] : severe partial [Consistent with my objective findings] : consistent with my objective findings

## 2024-02-01 ENCOUNTER — APPOINTMENT (OUTPATIENT)
Dept: ORTHOPEDIC SURGERY | Facility: HOSPITAL | Age: 44
End: 2024-02-01
Payer: OTHER MISCELLANEOUS

## 2024-02-01 ENCOUNTER — RESULT REVIEW (OUTPATIENT)
Age: 44
End: 2024-02-01

## 2024-02-01 PROCEDURE — 23430 REPAIR BICEPS TENDON: CPT | Mod: RT

## 2024-02-01 PROCEDURE — 29821 SHO ARTHRS SRG COMPL SYNVCT: CPT | Mod: AS,59,RT

## 2024-02-01 PROCEDURE — 29826 SHO ARTHRS SRG DECOMPRESSION: CPT | Mod: RT

## 2024-02-01 PROCEDURE — 29823 SHO ARTHRS SRG XTNSV DBRDMT: CPT | Mod: AS,59,RT

## 2024-02-01 PROCEDURE — 23430 REPAIR BICEPS TENDON: CPT | Mod: AS,RT

## 2024-02-01 PROCEDURE — 29821 SHO ARTHRS SRG COMPL SYNVCT: CPT | Mod: 59,RT

## 2024-02-01 PROCEDURE — 29823 SHO ARTHRS SRG XTNSV DBRDMT: CPT | Mod: 59,RT

## 2024-02-01 PROCEDURE — 29826 SHO ARTHRS SRG DECOMPRESSION: CPT | Mod: AS,RT

## 2024-02-01 RX ORDER — OXYCODONE 5 MG/1
5 TABLET ORAL
Qty: 30 | Refills: 0 | Status: ACTIVE | COMMUNITY
Start: 2024-02-01 | End: 1900-01-01

## 2024-02-09 ENCOUNTER — APPOINTMENT (OUTPATIENT)
Dept: ORTHOPEDIC SURGERY | Facility: CLINIC | Age: 44
End: 2024-02-09
Payer: OTHER MISCELLANEOUS

## 2024-02-09 PROCEDURE — 99024 POSTOP FOLLOW-UP VISIT: CPT

## 2024-02-09 NOTE — WORK
[Sprain/Strain] : sprain/strain [Torn Ligament/Tendon/Muscle] : torn ligament, tendon or muscle [Was the competent medical cause of the injury] : was the competent medical cause of the injury [Are consistent with the injury] : are consistent with the injury [Consistent with my objective findings] : consistent with my objective findings [Total (100%)] : total (100%)

## 2024-02-09 NOTE — DISCUSSION/SUMMARY
[de-identified] : This is a 44yo female 8 days s/p right arthroscopic rotator cuff MARY JANE/ capsulectomy, subacromial decompression and mini open biceps tenodesis.  patient is doing well. Started to wean from sling. sutures removed today in the office. All incisions are healing well with no evidence of infection.  Patient was provided PT prescription according to biceps tenodesis protocol.   Patient will follow up in 1 month.  Patient is considered 100% temporary disabled as result of surgery. Typical return to work full duties approximate 3 to 4 months.

## 2024-02-09 NOTE — PHYSICAL EXAM
[de-identified] : Constitutional: The general appearance of the patient is well developed, well nourished, no deformities and well groomed. Normal   Gait: Gait and function is as follows: normal gait.   Skin: Head and neck visualized skin is normal. Left upper extremity visualized skin is normal. Right upper extremity visualized skin is normal. Thoracic Skin of the thoracic spine shows visualized skin is normal.   Cardiovascular: palpable radial pulse bilaterally, good capillary refill in digits of the bilateral upper extremities and no temperature or color changes in the bilateral upper extremities.   Lymphatic: Normal Palpation of lymph nodes in the cervical.   Neurologic: fine motor control in the bilateral upper extremities is intact. Deep Tendon Reflexes in Upper and Lower Extremities Negative Eller's in the bilateral upper extremities. The patient is oriented to time, place and person. Sensation to light touch intact in the bilateral upper extremities. Mood and Affect is normal.   Right Shoulder:  Inspection of the shoulder/upper arm is as follows: Stable shoulder. Palpation of the shoulder/upper arm is as follows: There is mild diffuse tenderness . Range of motion of the shoulder is as follows: Limited secondary to immobilization/surgery. Strength of the shoulder is as follows:  Deltoid 5/5 Ligament Stability and Special Tests of the shoulder is as follows: Stable shoulder Incisions benign, no erythema/ swelling.  Left Shoulder: Inspection of the shoulder/upper arm is as follows: There is a full, pain-free, stable range of motion of the shoulder with normal strength and no tenderness to palpation.   Neck: Inspection / Palpation of the cervical spine is as follows: There is a mild restricted range of motion of the cervical spine. Increase tone and mild tenderness to palpation. Stable ROM of cervical spine.   Back, including spine: Inspection / Palpation of the thoracic/lumbar spine is as follows: There is a full, pain free, stable range of motion of the thoracic spine with a normal tone and not tenderness to palpation..

## 2024-02-09 NOTE — HISTORY OF PRESENT ILLNESS
[de-identified] : 43-year-old female presenting with severe right shoulder pain from original work-related injury that occurred 7/12/2023.  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  She states her pain started after assisting a severely neurologic deficit patient and assisted with transferring.  Patient started to the anterior aspect of the shoulder, now experiencing severe lateral pain.  Has significantly limited range of motion secondary to pain.  Admits to generalized stiffness.  She has been taking meloxicam as well as Tylenol prescribed by her PCP with no relief. Patient is RHD, PMHx DMII, working full duty  10/6/23: Patient here for right shoulder pain. Patient here for MRI review. Pt admits she can lift her arm more but still has pain  11/17/23: Patient here for right shoulder pain. Prior glenohumeral injection provided minimal relief. The pain returned and has become severe. Pt wishes to discuss definitive treatment.  Patient has been doing home exercise program and virtual physical therapy for more than 6 weeks without any improvement. 1/26/24:Patient returns today for follow-up of ongoing right shoulder pain.  Continues to report persistent pain and stiffness.  She has failed all conservative management wishes to discuss surgery. 2/6/24: Patient presents 8 days s/p right arthroscopic rotator cuff capsulectomy,debridement, SAD and biceps tenodesis. Patient is doing well, pain is controlled.  Patient has started to wean from sling. ROM improving. Denies any fever, chills, drainage.

## 2024-03-11 ENCOUNTER — APPOINTMENT (OUTPATIENT)
Dept: ORTHOPEDIC SURGERY | Facility: CLINIC | Age: 44
End: 2024-03-11
Payer: OTHER MISCELLANEOUS

## 2024-03-11 PROCEDURE — 99024 POSTOP FOLLOW-UP VISIT: CPT

## 2024-03-11 RX ORDER — MELOXICAM 15 MG/1
15 TABLET ORAL
Qty: 30 | Refills: 2 | Status: ACTIVE | COMMUNITY
Start: 2024-03-11 | End: 1900-01-01

## 2024-03-11 NOTE — PHYSICAL EXAM
[de-identified] : Constitutional: The general appearance of the patient is well developed, well nourished, no deformities and well groomed. Normal   Gait: Gait and function is as follows: normal gait.   Skin: Head and neck visualized skin is normal. Left upper extremity visualized skin is normal. Right upper extremity visualized skin is normal. Thoracic Skin of the thoracic spine shows visualized skin is normal.   Cardiovascular: palpable radial pulse bilaterally, good capillary refill in digits of the bilateral upper extremities and no temperature or color changes in the bilateral upper extremities.   Lymphatic: Normal Palpation of lymph nodes in the cervical.   Neurologic: fine motor control in the bilateral upper extremities is intact. Deep Tendon Reflexes in Upper and Lower Extremities Negative Eller's in the bilateral upper extremities. The patient is oriented to time, place and person. Sensation to light touch intact in the bilateral upper extremities. Mood and Affect is normal.   Right Shoulder:  Inspection of the shoulder/upper arm is as follows: Stable shoulder. Palpation of the shoulder/upper arm is as follows: There is mild diffuse tenderness . Range of motion of the shoulder is as follows: Limited secondary to immobilization/surgery. Strength of the shoulder is as follows:  Deltoid 5/5 Ligament Stability and Special Tests of the shoulder is as follows: Stable shoulder Incisions benign, no erythema/ swelling.  Left Shoulder: Inspection of the shoulder/upper arm is as follows: There is a full, pain-free, stable range of motion of the shoulder with normal strength and no tenderness to palpation.   Neck: Inspection / Palpation of the cervical spine is as follows: There is a mild restricted range of motion of the cervical spine. Increase tone and mild tenderness to palpation. Stable ROM of cervical spine.   Back, including spine: Inspection / Palpation of the thoracic/lumbar spine is as follows: There is a full, pain free, stable range of motion of the thoracic spine with a normal tone and not tenderness to palpation..

## 2024-03-11 NOTE — WORK
[Sprain/Strain] : sprain/strain [Torn Ligament/Tendon/Muscle] : torn ligament, tendon or muscle [Are consistent with the injury] : are consistent with the injury [Was the competent medical cause of the injury] : was the competent medical cause of the injury [Consistent with my objective findings] : consistent with my objective findings [Total (100%)] : total (100%)

## 2024-03-11 NOTE — HISTORY OF PRESENT ILLNESS
[de-identified] : 43-year-old female presenting with severe right shoulder pain from original work-related injury that occurred 7/12/2023.  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  She states her pain started after assisting a severely neurologic deficit patient and assisted with transferring.  Patient started to the anterior aspect of the shoulder, now experiencing severe lateral pain.  Has significantly limited range of motion secondary to pain.  Admits to generalized stiffness.  She has been taking meloxicam as well as Tylenol prescribed by her PCP with no relief. Patient is RHD, PMHx DMII, working full duty  10/6/23: Patient here for right shoulder pain. Patient here for MRI review. Pt admits she can lift her arm more but still has pain  11/17/23: Patient here for right shoulder pain. Prior glenohumeral injection provided minimal relief. The pain returned and has become severe. Pt wishes to discuss definitive treatment.  Patient has been doing home exercise program and virtual physical therapy for more than 6 weeks without any improvement. 1/26/24:Patient returns today for follow-up of ongoing right shoulder pain.  Continues to report persistent pain and stiffness.  She has failed all conservative management wishes to discuss surgery. 2/6/24: Patient presents 8 days s/p right arthroscopic rotator cuff capsulectomy,debridement, SAD and biceps tenodesis. Patient is doing well, pain is controlled.  Patient has started to wean from sling. ROM improving. Denies any fever, chills, drainage.  3/11/24: Patient presents 6 weeks s/p right arthroscopic rotator cuff capsulectomy, debridement, SAD and biceps tenodesis. Patient has good passive range of motion. Pt is doing PT two times a week. Pt still remains out of work.

## 2024-03-11 NOTE — DISCUSSION/SUMMARY
[de-identified] : This is a 42yo female 6 weeks s/p right arthroscopic rotator cuff capsulectomy, subacromial decompression and mini open biceps tenodesis.  patient is doing well. Patient still has pain with active assist motion Mobic prescribed  Patient was provided PT prescription according to biceps tenodesis protocol.   Patient will follow up in 6 weeks, could return to work at that time   Out of work note given Patient is considered 100% temporary disabled as result of surgery. Typical return to work full duties approximate 3 to 4 months.   (1) We discussed a comprehensive treatment plans that included a prescription management plan involving the use of prescription strength medications to include Ibuprofen 600-800 mg TID, versus 500-650 mg Tylenol. We also discussed prescribing topical diclofenac (Voltaren gel) as well as once daily Meloxicam 15 mg. (2) The patient has More Than One chronic injuries/illnesses as outlined, discussed, and documented by ICD 10 codes listed, as well as the HPI and Plan section. There is a moderate risk of morbidity with further treatment, especially from use of prescription strength medications and possible side effects of these medications which include upset stomach and cardiac/renal issues with long term use were discussed. (3) I recommended that the patient follow-up with their medical physician to discuss any significant specific potential issues with long term use such as interactions with current medications or with exacerbation of underlying medical morbidities.   Attestation: I, Bridget AMARO'Marina , attest that this documentation has been prepared under the direction and in the presence of Provider Grayson Bradshaw MD. The documentation recorded by the scribe, in my presence, accurately reflects the service I personally performed, and the decisions made by me with my edits as appropriate. Grayson Bradshaw MD

## 2024-04-22 ENCOUNTER — APPOINTMENT (OUTPATIENT)
Dept: ORTHOPEDIC SURGERY | Facility: CLINIC | Age: 44
End: 2024-04-22
Payer: OTHER MISCELLANEOUS

## 2024-04-22 PROCEDURE — 99024 POSTOP FOLLOW-UP VISIT: CPT

## 2024-04-22 RX ORDER — METHYLPREDNISOLONE 4 MG/1
4 TABLET ORAL
Qty: 1 | Refills: 0 | Status: ACTIVE | COMMUNITY
Start: 2024-04-22 | End: 1900-01-01

## 2024-04-22 NOTE — DISCUSSION/SUMMARY
[de-identified] : This is a 44yo female 10 weeks s/p right arthroscopic rotator cuff capsulectomy, subacromial decompression and mini open biceps tenodesis.  patient is doing well. Patient has been making consistent progress with physical therapy.  Does report mild setback over the last week with some residual stiffness. Emphasized the importance of continue with home exercise program in an effort to continue to improve range of motion. Patient was provided updated therapy prescription. Prescribed a Medrol Dosepak if she continues to feel some regression over the next 1 to 2 weeks.  Patient will follow up in 4 weeks, Likely return to work full duty at that time.  Out of work note given Patient is considered 100% temporary disabled as result of surgery. Typical return to work full duties approximate 3 to 4 months.   (1) We discussed a comprehensive treatment plans that included a prescription management plan involving the use of prescription strength medications to include Ibuprofen 600-800 mg TID, versus 500-650 mg Tylenol. We also discussed prescribing topical diclofenac (Voltaren gel) as well as once daily Meloxicam 15 mg. (2) The patient has More Than One chronic injuries/illnesses as outlined, discussed, and documented by ICD 10 codes listed, as well as the HPI and Plan section. There is a moderate risk of morbidity with further treatment, especially from use of prescription strength medications and possible side effects of these medications which include upset stomach and cardiac/renal issues with long term use were discussed. (3) I recommended that the patient follow-up with their medical physician to discuss any significant specific potential issues with long term use such as interactions with current medications or with exacerbation of underlying medical morbidities.   Attestation: I, Bridget Bean , attest that this documentation has been prepared under the direction and in the presence of Provider Grayson Bradshaw MD. The documentation recorded by the scribe, in my presence, accurately reflects the service I personally performed, and the decisions made by me with my edits as appropriate. Grayson Bradshaw MD

## 2024-04-22 NOTE — PHYSICAL EXAM
[de-identified] : Constitutional: The general appearance of the patient is well developed, well nourished, no deformities and well groomed. Normal   Gait: Gait and function is as follows: normal gait.   Skin: Head and neck visualized skin is normal. Left upper extremity visualized skin is normal. Right upper extremity visualized skin is normal. Thoracic Skin of the thoracic spine shows visualized skin is normal.   Cardiovascular: palpable radial pulse bilaterally, good capillary refill in digits of the bilateral upper extremities and no temperature or color changes in the bilateral upper extremities.   Lymphatic: Normal Palpation of lymph nodes in the cervical.   Neurologic: fine motor control in the bilateral upper extremities is intact. Deep Tendon Reflexes in Upper and Lower Extremities Negative Eller's in the bilateral upper extremities. The patient is oriented to time, place and person. Sensation to light touch intact in the bilateral upper extremities. Mood and Affect is normal.   Right Shoulder:  Inspection of the shoulder/upper arm is as follows: Stable shoulder. Palpation of the shoulder/upper arm is as follows: There is mild diffuse tenderness . Range of motion of the shoulder is as follows: Limited secondary to immobilization/surgery. Strength of the shoulder is as follows:  Deltoid 5/5 Ligament Stability and Special Tests of the shoulder is as follows: Stable shoulder Incisions benign, no erythema/ swelling.  Left Shoulder: Inspection of the shoulder/upper arm is as follows: There is a full, pain-free, stable range of motion of the shoulder with normal strength and no tenderness to palpation.   Neck: Inspection / Palpation of the cervical spine is as follows: There is a mild restricted range of motion of the cervical spine. Increase tone and mild tenderness to palpation. Stable ROM of cervical spine.   Back, including spine: Inspection / Palpation of the thoracic/lumbar spine is as follows: There is a full, pain free, stable range of motion of the thoracic spine with a normal tone and not tenderness to palpation..

## 2024-04-22 NOTE — HISTORY OF PRESENT ILLNESS
[de-identified] : 43-year-old female presenting with severe right shoulder pain from original work-related injury that occurred 7/12/2023.  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  She states her pain started after assisting a severely neurologic deficit patient and assisted with transferring.  Patient started to the anterior aspect of the shoulder, now experiencing severe lateral pain.  Has significantly limited range of motion secondary to pain.  Admits to generalized stiffness.  She has been taking meloxicam as well as Tylenol prescribed by her PCP with no relief. Patient is RHD, PMHx DMII, working full duty  10/6/23: Patient here for right shoulder pain. Patient here for MRI review. Pt admits she can lift her arm more but still has pain  11/17/23: Patient here for right shoulder pain. Prior glenohumeral injection provided minimal relief. The pain returned and has become severe. Pt wishes to discuss definitive treatment.  Patient has been doing home exercise program and virtual physical therapy for more than 6 weeks without any improvement. 1/26/24:Patient returns today for follow-up of ongoing right shoulder pain.  Continues to report persistent pain and stiffness.  She has failed all conservative management wishes to discuss surgery. 2/6/24: Patient presents 8 days s/p right arthroscopic rotator cuff capsulectomy,debridement, SAD and biceps tenodesis. Patient is doing well, pain is controlled.  Patient has started to wean from sling. ROM improving. Denies any fever, chills, drainage.  3/11/24: Patient presents 6 weeks s/p  right arthroscopic rotator cuff capsulectomy, debridement, SAD and biceps tenodesis. Patient has good passive range of motion. Pt is doing PT two times a week. Pt still remains out of work.  4/22/24: Patient returns today 10 weeks removed from right arthroscopic rotator cuff capsulectomy, debridement, SAD and biceps tenodesis. She has been completing physical therapy with steady improvement.  Patient does note mild setback over the last week with some residual stiffness.

## 2024-04-22 NOTE — WORK
[Sprain/Strain] : sprain/strain [Torn Ligament/Tendon/Muscle] : torn ligament, tendon or muscle [Was the competent medical cause of the injury] : was the competent medical cause of the injury [Are consistent with the injury] : are consistent with the injury [Consistent with my objective findings] : consistent with my objective findings [Total (100%)] : total (100%) [FreeTextEntry1] : fair

## 2024-05-24 ENCOUNTER — APPOINTMENT (OUTPATIENT)
Dept: ORTHOPEDIC SURGERY | Facility: CLINIC | Age: 44
End: 2024-05-24
Payer: OTHER MISCELLANEOUS

## 2024-05-24 PROCEDURE — 99214 OFFICE O/P EST MOD 30 MIN: CPT

## 2024-05-24 RX ORDER — METHYLPREDNISOLONE 4 MG/1
4 TABLET ORAL
Qty: 1 | Refills: 0 | Status: ACTIVE | COMMUNITY
Start: 2024-05-24 | End: 1900-01-01

## 2024-05-24 NOTE — HISTORY OF PRESENT ILLNESS
[de-identified] : 43-year-old female presenting with severe right shoulder pain from original work-related injury that occurred 7/12/2023.  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  She states her pain started after assisting a severely neurologic deficit patient and assisted with transferring.  Patient started to the anterior aspect of the shoulder, now experiencing severe lateral pain.  Has significantly limited range of motion secondary to pain.  Admits to generalized stiffness.  She has been taking meloxicam as well as Tylenol prescribed by her PCP with no relief. Patient is RHD, PMHx DMII, working full duty  10/6/23: Patient here for right shoulder pain. Patient here for MRI review. Pt admits she can lift her arm more but still has pain  11/17/23: Patient here for right shoulder pain. Prior glenohumeral injection provided minimal relief. The pain returned and has become severe. Pt wishes to discuss definitive treatment.  Patient has been doing home exercise program and virtual physical therapy for more than 6 weeks without any improvement. 1/26/24:Patient returns today for follow-up of ongoing right shoulder pain.  Continues to report persistent pain and stiffness.  She has failed all conservative management wishes to discuss surgery. 2/6/24: Patient presents 8 days s/p right arthroscopic rotator cuff capsulectomy,debridement, SAD and biceps tenodesis. Patient is doing well, pain is controlled.  Patient has started to wean from sling. ROM improving. Denies any fever, chills, drainage.  3/11/24: Patient presents 6 weeks s/p  right arthroscopic rotator cuff capsulectomy, debridement, SAD and biceps tenodesis. Patient has good passive range of motion. Pt is doing PT two times a week. Pt still remains out of work.  4/22/24: Patient returns today 10 weeks removed from right arthroscopic rotator cuff capsulectomy, debridement, SAD and biceps tenodesis. She has been completing physical therapy with steady improvement.  Patient does note mild setback over the last week with some residual stiffness. 5/24/24: Patient returns today nearly 4 months removed from right arthroscopic capsulectomy, rotator cuff debridement, subacromial compression and bicep tenodesis.  Previous physical therapy request was denied.  Patient does continue to have mild residual stiffness.

## 2024-05-24 NOTE — DISCUSSION/SUMMARY
[de-identified] : This is a 44yo female nearly 4 months s/p right arthroscopic rotator cuff capsulectomy, subacromial decompression and mini open biceps tenodesis.  patient is doing well. Patient has been making consistent progress with physical therapy.  Does report mild setback over the last week with some residual stiffness. Previous authorization for PT has been denied. Patient would significantly benefit from additional course of therapy to be manipulated from a physical therapist to maximize her range of motion. Emphasized the importance of continue with home exercise program in an effort to continue to improve range of motion While waiting for authorization. Prescribed a Medrol Dosepak To complete once physical therapy has been improved.  Patient will follow up in 4 weeks, Likely return to work full duty at that time.  Out of work note given Patient is considered 100% temporary disabled as result of surgery. Typical return to work full duties approximate 3 to 4 months.   (1) We discussed a comprehensive treatment plans that included a prescription management plan involving the use of prescription strength medications to include Ibuprofen 600-800 mg TID, versus 500-650 mg Tylenol. We also discussed prescribing topical diclofenac (Voltaren gel) as well as once daily Meloxicam 15 mg. (2) The patient has More Than One chronic injuries/illnesses as outlined, discussed, and documented by ICD 10 codes listed, as well as the HPI and Plan section. There is a moderate risk of morbidity with further treatment, especially from use of prescription strength medications and possible side effects of these medications which include upset stomach and cardiac/renal issues with long term use were discussed. (3) I recommended that the patient follow-up with their medical physician to discuss any significant specific potential issues with long term use such as interactions with current medications or with exacerbation of underlying medical morbidities.   Attestation: I, Bridget Bean , attest that this documentation has been prepared under the direction and in the presence of Provider Grayson Bradshaw MD. The documentation recorded by the scribe, in my presence, accurately reflects the service I personally performed, and the decisions made by me with my edits as appropriate. Grayson Bradshaw MD

## 2024-05-24 NOTE — PHYSICAL EXAM
[de-identified] : Constitutional: The general appearance of the patient is well developed, well nourished, no deformities and well groomed. Normal   Gait: Gait and function is as follows: normal gait.   Skin: Head and neck visualized skin is normal. Left upper extremity visualized skin is normal. Right upper extremity visualized skin is normal. Thoracic Skin of the thoracic spine shows visualized skin is normal.   Cardiovascular: palpable radial pulse bilaterally, good capillary refill in digits of the bilateral upper extremities and no temperature or color changes in the bilateral upper extremities.   Lymphatic: Normal Palpation of lymph nodes in the cervical.   Neurologic: fine motor control in the bilateral upper extremities is intact. Deep Tendon Reflexes in Upper and Lower Extremities Negative Eller's in the bilateral upper extremities. The patient is oriented to time, place and person. Sensation to light touch intact in the bilateral upper extremities. Mood and Affect is normal.   Right Shoulder:  Inspection of the shoulder/upper arm is as follows: Stable shoulder. Palpation of the shoulder/upper arm is as follows: There is mild diffuse tenderness . Range of motion of the shoulder is as follows: Limited secondary to immobilization/surgery. Strength of the shoulder is as follows:  Deltoid 5/5 Ligament Stability and Special Tests of the shoulder is as follows: Stable shoulder Incisions benign, no erythema/ swelling.  Left Shoulder: Inspection of the shoulder/upper arm is as follows: There is a full, pain-free, stable range of motion of the shoulder with normal strength and no tenderness to palpation.   Neck: Inspection / Palpation of the cervical spine is as follows: There is a mild restricted range of motion of the cervical spine. Increase tone and mild tenderness to palpation. Stable ROM of cervical spine.   Back, including spine: Inspection / Palpation of the thoracic/lumbar spine is as follows: There is a full, pain free, stable range of motion of the thoracic spine with a normal tone and not tenderness to palpation..

## 2024-06-21 ENCOUNTER — APPOINTMENT (OUTPATIENT)
Dept: ORTHOPEDIC SURGERY | Facility: CLINIC | Age: 44
End: 2024-06-21
Payer: OTHER MISCELLANEOUS

## 2024-06-21 DIAGNOSIS — M75.01 ADHESIVE CAPSULITIS OF RIGHT SHOULDER: ICD-10-CM

## 2024-06-21 DIAGNOSIS — M25.511 PAIN IN RIGHT SHOULDER: ICD-10-CM

## 2024-06-21 DIAGNOSIS — M75.21 BICIPITAL TENDINITIS, RIGHT SHOULDER: ICD-10-CM

## 2024-06-21 PROCEDURE — 99214 OFFICE O/P EST MOD 30 MIN: CPT

## 2024-06-21 NOTE — DISCUSSION/SUMMARY
[de-identified] : This is a 44yo female nearly 5 months s/p right arthroscopic rotator cuff capsulectomy, subacromial decompression and mini open biceps tenodesis.  patient is doing well. Patient patient has had slightly complicated postoperative period in which her physical therapy was denied 2 months postop. She has been completing an aggressive home exercise program but does continue to have slight stiffness with range of motion. At this point we will again seek additional authorization for PT in an effort to improve her range of motion and return patient to work full duty. Recommend the patient to complete Medrol Dosepak to help alleviate inflammation in an effort to improve stretching.   Patient will follow up in 4 weeks, Likely return to work full duty at that time.  Out of work note given Patient is considered 100% temporary disabled as result of surgery.    (1) We discussed a comprehensive treatment plans that included a prescription management plan involving the use of prescription strength medications to include Ibuprofen 600-800 mg TID, versus 500-650 mg Tylenol. We also discussed prescribing topical diclofenac (Voltaren gel) as well as once daily Meloxicam 15 mg. (2) The patient has More Than One chronic injuries/illnesses as outlined, discussed, and documented by ICD 10 codes listed, as well as the HPI and Plan section. There is a moderate risk of morbidity with further treatment, especially from use of prescription strength medications and possible side effects of these medications which include upset stomach and cardiac/renal issues with long term use were discussed. (3) I recommended that the patient follow-up with their medical physician to discuss any significant specific potential issues with long term use such as interactions with current medications or with exacerbation of underlying medical morbidities.   Attestation: I, Bridget Bean , attest that this documentation has been prepared under the direction and in the presence of Provider Grayson Bradshaw MD. The documentation recorded by the scribe, in my presence, accurately reflects the service I personally performed, and the decisions made by me with my edits as appropriate. Grayson Bradshaw MD

## 2024-06-21 NOTE — PHYSICAL EXAM
[de-identified] : Constitutional: The general appearance of the patient is well developed, well nourished, no deformities and well groomed. Normal   Gait: Gait and function is as follows: normal gait.   Skin: Head and neck visualized skin is normal. Left upper extremity visualized skin is normal. Right upper extremity visualized skin is normal. Thoracic Skin of the thoracic spine shows visualized skin is normal.   Cardiovascular: palpable radial pulse bilaterally, good capillary refill in digits of the bilateral upper extremities and no temperature or color changes in the bilateral upper extremities.   Lymphatic: Normal Palpation of lymph nodes in the cervical.   Neurologic: fine motor control in the bilateral upper extremities is intact. Deep Tendon Reflexes in Upper and Lower Extremities Negative Eller's in the bilateral upper extremities. The patient is oriented to time, place and person. Sensation to light touch intact in the bilateral upper extremities. Mood and Affect is normal.   Right Shoulder:  Inspection of the shoulder/upper arm is as follows: Stable shoulder. Palpation of the shoulder/upper arm is as follows: There is mild diffuse tenderness . Range of motion of the shoulder is as follows: Limited secondary to immobilization/surgery. Strength of the shoulder is as follows:  Deltoid 5/5 Ligament Stability and Special Tests of the shoulder is as follows: Stable shoulder Incisions benign, no erythema/ swelling.  Left Shoulder: Inspection of the shoulder/upper arm is as follows: There is a full, pain-free, stable range of motion of the shoulder with normal strength and no tenderness to palpation.   Neck: Inspection / Palpation of the cervical spine is as follows: There is a mild restricted range of motion of the cervical spine. Increase tone and mild tenderness to palpation. Stable ROM of cervical spine.   Back, including spine: Inspection / Palpation of the thoracic/lumbar spine is as follows: There is a full, pain free, stable range of motion of the thoracic spine with a normal tone and not tenderness to palpation..

## 2024-06-21 NOTE — HISTORY OF PRESENT ILLNESS
[de-identified] : 43-year-old female presenting with severe right shoulder pain from original work-related injury that occurred 7/12/2023.  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  She states her pain started after assisting a severely neurologic deficit patient and assisted with transferring.  Patient started to the anterior aspect of the shoulder, now experiencing severe lateral pain.  Has significantly limited range of motion secondary to pain.  Admits to generalized stiffness.  She has been taking meloxicam as well as Tylenol prescribed by her PCP with no relief. Patient is RHD, PMHx DMII, working full duty  10/6/23: Patient here for right shoulder pain. Patient here for MRI review. Pt admits she can lift her arm more but still has pain  11/17/23: Patient here for right shoulder pain. Prior glenohumeral injection provided minimal relief. The pain returned and has become severe. Pt wishes to discuss definitive treatment.  Patient has been doing home exercise program and virtual physical therapy for more than 6 weeks without any improvement. 1/26/24:Patient returns today for follow-up of ongoing right shoulder pain.  Continues to report persistent pain and stiffness.  She has failed all conservative management wishes to discuss surgery. 2/6/24: Patient presents 8 days s/p right arthroscopic rotator cuff capsulectomy,debridement, SAD and biceps tenodesis. Patient is doing well, pain is controlled.  Patient has started to wean from sling. ROM improving. Denies any fever, chills, drainage.  3/11/24: Patient presents 6 weeks s/p  right arthroscopic rotator cuff capsulectomy, debridement, SAD and biceps tenodesis. Patient has good passive range of motion. Pt is doing PT two times a week. Pt still remains out of work.  4/22/24: Patient returns today 10 weeks removed from right arthroscopic rotator cuff capsulectomy, debridement, SAD and biceps tenodesis. She has been completing physical therapy with steady improvement.  Patient does note mild setback over the last week with some residual stiffness. 5/24/24: Patient returns today nearly 4 months removed from right arthroscopic capsulectomy, rotator cuff debridement, subacromial compression and bicep tenodesis.  Previous physical therapy request was denied.  Patient does continue to have mild residual stiffness. 6/21/24: Patient returns nearly 5 months removed from right arthroscopic capsulectomy, rotator cuff debridement, subacromial decompression and bicep tenodesis.  She has been completing a home exercise program.  Unfortunately previous request for physical therapy was additionally denied.

## 2024-07-17 ENCOUNTER — NON-APPOINTMENT (OUTPATIENT)
Age: 44
End: 2024-07-17

## 2024-08-09 ENCOUNTER — APPOINTMENT (OUTPATIENT)
Dept: ORTHOPEDIC SURGERY | Facility: CLINIC | Age: 44
End: 2024-08-09